# Patient Record
(demographics unavailable — no encounter records)

---

## 2025-07-23 NOTE — HISTORY OF PRESENT ILLNESS
[FreeTextEntry1] : 32yo  @ 30'5 by DIANNE of  [facility] (LMP 24) based on first tri sono with h/o obesity and prediabetes. She is transferring care so that she can deliver and [facility]. She reports failing her 1hr gtt but has not done her 3hr gtt.  Her anatomy scan was repeated for failure to visualize the spine. Repeat sono revealed a normal spine.   Pt denies VB, ctx, dFM. She feels like she feels wet prior to her using the bathroom but says she does not feel urine coming out. Started 8 days ago but not daily. She was seen at [facility]   Pt has h/o PCS for failed second stage and desires repeat CS.  [PapSmeardate] : 2024

## 2025-07-23 NOTE — REASON FOR VISIT
[Initial] : an initial consultation for [Interpreters_IDNumber] : [id number] [Interpreters_FullName] : Milady [TWNoteComboBox1] : Bulgarian

## 2025-07-23 NOTE — PHYSICAL EXAM
[FreeTextEntry7] : obese abdomen; FHR 142bpm; uterine size does not match dates >> larger.  NST : 145, mod agus., + 15 x 15 accel, - decel  [place]: flat  [Labia Majora] : normal [Labia Minora] : normal [Normal] : normal [Enlarged ___ wks] : enlarged [unfilled] ~Uweeks [Uterine Adnexae] : non-palpable [FreeTextEntry5] : long closed, FFN not sent

## 2025-07-23 NOTE — DISCUSSION/SUMMARY
[FreeTextEntry1] : 32yo  @ 30'5 by DIANNE of  [facility] (LMP 24) with failed 1 hr glucola and h/o prediabetes as well as morbid obesity and h/o PCS desires BS for sterilization.   Prediabetes/failed 1hr GTT:  - Will refer to MFM for consult  - Rx given for 3hr glucose testing  - Informed patient that she needs to have this test done ASAP   Obesity: BMI 51 - Pt not taking baby ASA and is too late to start  - Will order baseline PEC labs today  - Will need fetal testing @ 32 wks - growth sono q4 wks -- first ASAP   Desires permanent sterilization:  - [place] signed  by Dr Brock  - RBA reviewed with pt using    RPNC:  - glucose testing as above  - O+/Abs neg  - TDaP info given to pt in German today for her to decide if she wants vaccine  - Will need growth sono ASAP  - Precautions for PPROM, PTL, VB, dFM reviewed  - RTO ASAP for sono and MD visit    I spent 90 min in taking history, examining, consulting and writing the note for this patient.  Michelle Brock MD  Obstetrician/Gynecologist